# Patient Record
Sex: FEMALE | URBAN - METROPOLITAN AREA
[De-identification: names, ages, dates, MRNs, and addresses within clinical notes are randomized per-mention and may not be internally consistent; named-entity substitution may affect disease eponyms.]

---

## 2020-10-18 ENCOUNTER — NURSE TRIAGE (OUTPATIENT)
Dept: NURSING | Facility: CLINIC | Age: 45
End: 2020-10-18

## 2020-10-18 NOTE — TELEPHONE ENCOUNTER
Patient trying to get a message to a non- Health  provider - advised she call PCP in morning during clinic hours.    Sara Alfredo RN on 10/18/2020 at 4:50 PM